# Patient Record
Sex: MALE | Race: WHITE | HISPANIC OR LATINO | Employment: UNEMPLOYED | URBAN - METROPOLITAN AREA
[De-identification: names, ages, dates, MRNs, and addresses within clinical notes are randomized per-mention and may not be internally consistent; named-entity substitution may affect disease eponyms.]

---

## 2019-07-18 ENCOUNTER — TRANSCRIBE ORDERS (OUTPATIENT)
Dept: PHYSICAL THERAPY | Facility: CLINIC | Age: 69
End: 2019-07-18

## 2019-07-18 ENCOUNTER — EVALUATION (OUTPATIENT)
Dept: PHYSICAL THERAPY | Facility: CLINIC | Age: 69
End: 2019-07-18
Payer: COMMERCIAL

## 2019-07-18 DIAGNOSIS — I67.9 HEMIPARESIS OF RIGHT DOMINANT SIDE DUE TO CEREBROVASCULAR DISEASE (HCC): ICD-10-CM

## 2019-07-18 DIAGNOSIS — I63.9 ENCEPHALOMALACIA WITH CEREBRAL INFARCTION (HCC): Primary | ICD-10-CM

## 2019-07-18 DIAGNOSIS — G93.89 ENCEPHALOMALACIA WITH CEREBRAL INFARCTION (HCC): Primary | ICD-10-CM

## 2019-07-18 DIAGNOSIS — G81.91 HEMIPARESIS OF RIGHT DOMINANT SIDE DUE TO CEREBROVASCULAR DISEASE (HCC): ICD-10-CM

## 2019-07-18 PROCEDURE — 97163 PT EVAL HIGH COMPLEX 45 MIN: CPT | Performed by: PHYSICAL THERAPIST

## 2019-07-18 PROCEDURE — 97110 THERAPEUTIC EXERCISES: CPT | Performed by: PHYSICAL THERAPIST

## 2019-07-18 NOTE — LETTER
2019    DO Geetha Sebastian [address]  [address] [address]    Patient: Rey Gomez   YOB: 1950   Date of Visit: 2019     Encounter Diagnosis     ICD-10-CM    1  Encephalomalacia with cerebral infarction ([facility] ) G93 89     I63 9    2  Hemiparesis of right dominant side due to cerebrovascular disease [facility]) I67         Dear Dr Marly Sams:    Please review the attached Plan of Care from [facility] recent visit  Please verify that you agree therapy should continue by signing the attached document and sending it back to our office  If you have any questions or concerns, please don't hesitate to call  Sincerely,    Tye Dang, PT      Referring Provider:      I certify that I have read the below Plan of Care and certify the need for these services furnished under this plan of treatment while under my care  DO Geetha Sebastian [address]  [address]: [phone]          PT Evaluation     Today's date: 2019  Patient name: Rey Gomez  : 1950  MRN: [id number]  Referring provider: Khalida Frankel DO  Dx:   Encounter Diagnosis     ICD-10-CM    1  Encephalomalacia with cerebral infarction ([facility] ) G93 89     I63 9    2  Hemiparesis of right dominant side due to cerebrovascular disease ([facility] ) I67 9     G81 91        Start Time: 1330  Stop Time: 1445  Total time in clinic (min): 75 minutes    Assessment  Assessment details: Louis Elliott presents subacute residual right sided weakness secondary to right CVA  Apparently, patient was experiencing headache, drooping facial muscles and right arm and leg weakness 2019  He was seen in ER and  CT scan revealed a new left parietal lobe infarct  The patient is alert, and speech is good ([place]  His daughter helped with interpretation  /84 HR 68 Ht  5'6 Wt  150   Initially, the patient was unable to walk due to right LE weakness, but this has improved  He is presenting left hand and shoulder syndrome with swelling in wrist and hand and laxity in shoulder  Mild right UE flexion synergy   AROM right ankle DF 5  PF 35  Strength right ankle DF 4   PF -4  AROM right  wrist E 45 F 20    AROM R Elbow F  and  E WNL  Forearm SUP 60  PRON 90  AROM  Right  hand                 Thumb   Index  Middle Ring  fifth    MPC        50         70       60      60      40     PIP          65         75      65      70      35  Fist closure incomplete  Strength Right wrist  E  +3  F -3  AROM Right shoulder ROM F / Abd +3  Elbow -4   R hand dominant  Decreased functional use of right hand for ADL's, including  , pinch, and digital dexterity for writing and other fine motors  Decrease shoulder function for overhead reach, lift and carry  Gait: hesitant and cautious gait with decreased step length and gait speed; inconsistent right heel strike; mild right foot slap     STG  Reduce swelling right wrist and hand   by 25% 2-3 weeks  Increase active Wrist and hand motion ROM 5-8 degrees 2-3 weeks  Promote  fist closure 3 weeks  Increase right UE and LE strength 1/2 grade 4 weeks    LTG  Maximize active wrist and hand and shoulder ROM   Maximize right UE and LE Strength  Improve gait and ambulation stability  Resume normal functional use of right  UE    Plan  Plan details:     2 X per week 6 weeks  MT - passive ROM right UE; STM   TE - right UE ROM LE strengthening  GT        Subjective    Objective   He is presenting left hand and shoulder syndrome with swelling in wrist and hand and laxity in shoulder      AROM Right wrist E 45 F 20  AROM  Right  hand                 Thumb   Index  Middle Ring  fifth    MPC        50         70       60      60      40     PIP          65         75      65      70      35  Fist closure incomplete  Strength Right wrist  E  +3  F -3  AROM Right shoulder ROM F / Abd +3  Elbow -4                Manual          PROM right shoulder, wrist and hand                STM wrist/hand                    Exercise Diary         MAT          Trunk Rotation        March        T-band Abd        SAQ                Seated        LAQ        T-band DF        Pulleys        Active wrist ROM        Finger "dance"        Standing        Calf raise        March        Mini squat                BALANCE         cone reach

## 2019-07-18 NOTE — PROGRESS NOTES
PT Evaluation     Today's date: 2019  Patient name: Iliana Gifford  : 1950  MRN: [id number]  Referring provider: Isidro Wick DO  Dx:   Encounter Diagnosis     ICD-10-CM    1  Encephalomalacia with cerebral infarction ([facility] ) G93 89     I63 9    2  Hemipares[facility] of right dominant side due to [facility] ) I67 9     G81 91        Start Time: 1330  Stop Time: 1445  Total time in clinic (min): 75 minutes    Assessment  Assessment details: Felice Gustafson presents subacute residual right sided weakness secondary to right CVA  Apparently, patient was experiencing headache, drooping facial muscles and right arm and leg weakness 2019  He was seen in ER and  CT scan revealed a new left parietal lobe infarct  The patient is alert, and speech is good ([place]  His daughter helped with interpretation  /84 HR 68 Ht  5'6 Wt  150   Initially, the patient was unable to walk due to right LE weakness, but this has improved  He is presenting left hand and shoulder syndrome with swelling in wrist and hand and laxity in shoulder  Mild right UE flexion synergy     AROM right ankle DF 5  PF 35  Strength right ankle DF 4   PF -4  AROM right  wrist E 45 F 20    AROM R Elbow F  and  E WNL  Forearm SUP 60  PRON 90  AROM  Right  hand                 Thumb   Index  Middle Ring  fifth    MPC        50         70       60      60      40     PIP          65         75      65      70      35  Fist closure incomplete  Strength Right wrist  E  +3  F -3  AROM Right shoulder ROM F / Abd +3  Elbow -4   R hand dominant  Decreased functional use of right hand for ADL's, including  , pinch, and digital dexterity for writing and other fine motors  Decrease shoulder function for overhead reach, lift and carry  Gait: hesitant and cautious gait with decreased step length and gait speed; inconsistent right heel strike; mild right foot slap     STG  Reduce swelling right wrist and hand   by 25% 2-3 weeks  Increase active Wrist and hand motion ROM 5-8 degrees 2-3 weeks  Promote  fist closure 3 weeks  Increase right UE and LE strength 1/2 grade 4 weeks    LTG  Maximize active wrist and hand and shoulder ROM   Maximize right UE and LE Strength  Improve gait and ambulation stability  Resume normal functional use of right  UE    Plan  Plan details:     2 X per week 6 weeks  MT - passive ROM right UE; STM   TE - right UE ROM LE strengthening  GT        Subjective    Objective   He is presenting left hand and shoulder syndrome with swelling in wrist and hand and laxity in shoulder      AROM Right wrist E 45 F 20  AROM  Right  hand                 Thumb   Index  Middle Ring  fifth    MPC        50         70       60      60      40     PIP          65         75      65      70      35  Fist closure incomplete  Strength Right wrist  E  +3  F -3  AROM Right shoulder ROM F / Abd +3  Elbow -4                Manual          PROM right shoulder, wrist and hand                STM wrist/hand                    Exercise Diary         MAT          Trunk Rotation        March        T-band Abd        SAQ                Seated        LAQ        T-band DF        Pulleys        Active wrist ROM        Finger "dance"        Standing        Calf raise        March        Mini squat                BALANCE         cone reach

## 2019-07-23 ENCOUNTER — OFFICE VISIT (OUTPATIENT)
Dept: PHYSICAL THERAPY | Facility: CLINIC | Age: 69
End: 2019-07-23
Payer: COMMERCIAL

## 2019-07-23 DIAGNOSIS — G81.91 HEMIPARESIS OF RIGHT DOMINANT SIDE DUE TO CEREBROVASCULAR DISEASE (HCC): ICD-10-CM

## 2019-07-23 DIAGNOSIS — G93.89 ENCEPHALOMALACIA WITH CEREBRAL INFARCTION (HCC): Primary | ICD-10-CM

## 2019-07-23 DIAGNOSIS — I63.9 ENCEPHALOMALACIA WITH CEREBRAL INFARCTION (HCC): Primary | ICD-10-CM

## 2019-07-23 DIAGNOSIS — I67.9 HEMIPARESIS OF RIGHT DOMINANT SIDE DUE TO CEREBROVASCULAR DISEASE (HCC): ICD-10-CM

## 2019-07-23 PROCEDURE — 97110 THERAPEUTIC EXERCISES: CPT

## 2019-07-23 PROCEDURE — 97140 MANUAL THERAPY 1/> REGIONS: CPT

## 2019-07-23 NOTE — PROGRESS NOTES
Daily Note     Today's date: 2019  Patient name: Rey Gomez  : 1950  MRN: [id number]  Referring provider: Khalida Frankel DO  Dx:   Encounter Diagnosis     ICD-10-CM    1  Encephalomalacia with cerebral infarction ([facility] ) G93 89     I63 9    2  Hemiparesis of right dominant side due to cerebrovascular disease ([facility] ) I67 9     G81 91        Start Time: 1645  Stop Time: 1720  Total time in clinic (min): 35 minutes    Subjective: Pt comments(via ) that his swelling limits his hand motion  Objective:Pt was provided [facility] for edema control to the L hand  PTA instructed his daughter in same for home  PROM of the L UE provided F/U initiation of pulleys for shoulder motion  Assessment: Tolerated treatment well  Patient would benefit from continued PT      Plan: Continue per plan of care  Added tendon glidessimeonty next visit       Manual          PROM right shoulder, wrist and hand 10               STM wrist/hand 10                   Exercise Diary         MAT          Trunk Rotation        March        T-band Abd        SAQ                Seated        LAQ        T-band DF        Pulleys 2 min        Active wrist ROM        Finger "dance" 10x        Standing        Calf raise        March        Mini squat                BALANCE         cone reach

## 2019-07-25 ENCOUNTER — OFFICE VISIT (OUTPATIENT)
Dept: PHYSICAL THERAPY | Facility: CLINIC | Age: 69
End: 2019-07-25
Payer: COMMERCIAL

## 2019-07-25 DIAGNOSIS — I63.9 ENCEPHALOMALACIA WITH CEREBRAL INFARCTION (HCC): Primary | ICD-10-CM

## 2019-07-25 DIAGNOSIS — G81.91 HEMIPARESIS OF RIGHT DOMINANT SIDE DUE TO CEREBROVASCULAR DISEASE (HCC): ICD-10-CM

## 2019-07-25 DIAGNOSIS — I67.9 HEMIPARESIS OF RIGHT DOMINANT SIDE DUE TO CEREBROVASCULAR DISEASE (HCC): ICD-10-CM

## 2019-07-25 DIAGNOSIS — G93.89 ENCEPHALOMALACIA WITH CEREBRAL INFARCTION (HCC): Primary | ICD-10-CM

## 2019-07-25 PROCEDURE — 97535 SELF CARE MNGMENT TRAINING: CPT | Performed by: PHYSICAL THERAPIST

## 2019-07-25 PROCEDURE — 97110 THERAPEUTIC EXERCISES: CPT | Performed by: PHYSICAL THERAPIST

## 2019-07-25 PROCEDURE — 97140 MANUAL THERAPY 1/> REGIONS: CPT | Performed by: PHYSICAL THERAPIST

## 2019-07-25 NOTE — PROGRESS NOTES
Daily Note     Today's date: 2019  Patient name: Katey Dunn  : 1950  MRN: [id number]  Referring provider: Jabari Estrada DO  Dx:   Encounter Diagnosis     ICD-10-CM    1  Encephalomalacia with cerebral infarction ([facility] ) G93 89     I63 9    2  Hemiparesis of right dominant side due to cerebrovascular disease ([email or web address] ) I67 9     G81 91        Start Time: 1600  Stop Time: 1655  Total time in clinic (min): 55 minutes    Subjective: Pt with no new complaints at this time  No pain reported at start of treatment  Objective: See treatment diary below      Assessment: Tolerated treatment well  Patient would benefit from continued PT   used t/o tx 2* communication barrier  Pt tolerated manual techniques well with minor pain reported in posterior shoulder at end range ER  Decreased swelling noted of right hand/wrist compared to previous treatment  Continue to progress as tolerated  Provided HEP for tendon glides  Plan: Continue per plan of care  Progress treatment as tolerated         Manual         PROM right shoulder, wrist and hand 10 10              STM wrist/hand 10 10                  Exercise Diary        MAT          Trunk Rotation        March        T-band Abd        SAQ                Seated        LAQ        T-band DF        Pulleys 2 min  2 min      Active wrist ROM        Finger "dance" 10x  20x      Standing        Calf raise        March        Mini squat                BALANCE         cone reach                Tendon glides  HEP

## 2019-07-30 ENCOUNTER — OFFICE VISIT (OUTPATIENT)
Dept: PHYSICAL THERAPY | Facility: CLINIC | Age: 69
End: 2019-07-30
Payer: COMMERCIAL

## 2019-07-30 DIAGNOSIS — G81.91 HEMIPARESIS OF RIGHT DOMINANT SIDE DUE TO CEREBROVASCULAR DISEASE (HCC): ICD-10-CM

## 2019-07-30 DIAGNOSIS — I67.9 HEMIPARESIS OF RIGHT DOMINANT SIDE DUE TO CEREBROVASCULAR DISEASE (HCC): ICD-10-CM

## 2019-07-30 DIAGNOSIS — G93.89 ENCEPHALOMALACIA WITH CEREBRAL INFARCTION (HCC): Primary | ICD-10-CM

## 2019-07-30 DIAGNOSIS — I63.9 ENCEPHALOMALACIA WITH CEREBRAL INFARCTION (HCC): Primary | ICD-10-CM

## 2019-07-30 PROCEDURE — 97112 NEUROMUSCULAR REEDUCATION: CPT | Performed by: PHYSICAL THERAPIST

## 2019-07-30 PROCEDURE — 97110 THERAPEUTIC EXERCISES: CPT | Performed by: PHYSICAL THERAPIST

## 2019-07-30 PROCEDURE — 97140 MANUAL THERAPY 1/> REGIONS: CPT | Performed by: PHYSICAL THERAPIST

## 2019-07-30 NOTE — PROGRESS NOTES
Daily Note     Today's date: 2019  Patient name: Delicia Escamilla  : 1950  MRN: [id number]  Referring provider: Gabe Farias DO  Dx:   Encounter Diagnosis     ICD-10-CM    1  Encephalomalacia with cerebral infarction ([facility] ) G93 89     I63 9    2  Hemiparesis of right dominant side due to cerebrovascular disease ([facility] ) I67 9     G81 91        Start Time: 1620  Stop Time: 1725  Total time in clinic (min): 65 minutes    Subjective: Patient continues to have swelling in right hand, and shoulder hand syndrome pain  Objective:PT added UBE to program, and used Velcro wrap to secure  on handle, as well, when using pulleys  PT performed some facilitory shoulder approximation and triceps tapping to elicit control of elbow extension with the patient in supine  Assessment: Tolerated treatment well  Patient noted better hand motion after treatment      Plan: Continue per plan of care        Manual        PROM right shoulder, wrist and hand 10 10 10     Facilitory tech    10     STM wrist/hand 10 10 5                 Exercise Diary       MAT     #4      Trunk Rotation           T-band Abd        SAQ   #4 3/10     Cane press   2/10             UBE   LV3 6 min     Seated        LAQ        T-band DF        Pulleys 2 min  2 min 4 min     Active wrist ROM        Finger "dance" 10x  20x 2/10     Standing        Calf raise        March        Mini squat                BALANCE         cone reach                Tendon glides  HEP

## 2019-08-01 ENCOUNTER — OFFICE VISIT (OUTPATIENT)
Dept: PHYSICAL THERAPY | Facility: CLINIC | Age: 69
End: 2019-08-01
Payer: COMMERCIAL

## 2019-08-01 DIAGNOSIS — G81.91 HEMIPARESIS OF RIGHT DOMINANT SIDE DUE TO CEREBROVASCULAR DISEASE (HCC): ICD-10-CM

## 2019-08-01 DIAGNOSIS — I63.9 ENCEPHALOMALACIA WITH CEREBRAL INFARCTION (HCC): Primary | ICD-10-CM

## 2019-08-01 DIAGNOSIS — G93.89 ENCEPHALOMALACIA WITH CEREBRAL INFARCTION (HCC): Primary | ICD-10-CM

## 2019-08-01 DIAGNOSIS — I67.9 HEMIPARESIS OF RIGHT DOMINANT SIDE DUE TO CEREBROVASCULAR DISEASE (HCC): ICD-10-CM

## 2019-08-01 PROCEDURE — 97110 THERAPEUTIC EXERCISES: CPT | Performed by: PHYSICAL THERAPIST

## 2019-08-01 PROCEDURE — 97140 MANUAL THERAPY 1/> REGIONS: CPT | Performed by: PHYSICAL THERAPIST

## 2019-08-01 NOTE — PROGRESS NOTES
Daily Note     Today's date: 2019  Patient name: Remi Cope  : 1950  MRN: [id number]  Referring provider: Tiara Velásquez DO  Dx:   Encounter Diagnosis     ICD-10-CM    1  Encephalomalacia with cerebral infarction ([facility] ) G93 89     I63 9    2  Hemiparesis of right dominant side due to cerebrovascular disease ([facility] ) I67 9     G81 91        Start Time:   Stop Time:   Total time in clinic (min): 60 minutes    Subjective: Patient noted improvement in hand and finger motion since previous session  Objective: PT directed exercises, and repeated facilitory techniques of shoulder approximation and triceps tapping      Assessment: Tolerated treatment well  Patient's family instructed in HEP  Plan: Continue per plan of care        Manual       PROM right shoulder, wrist and hand 10 10 10 5    AAROM right shoulder, wrist and hand    5    Facilitory tech    10 10    STM wrist/hand 10 10 5 5                Exercise Diary      MAT     #4      Trunk Rotation           T-band Abd        SAQ   #4 3/10     Cane press   2/10             UBE   LV3 6 min LV3 8 m    Seated        LAQ        T-band DF        Pulleys 2 min  2 min 4 min 4m    Active wrist ROM        Finger "dance" 10x  20x 2/10 2/10    Standing        Calf raise    ---->    March    ---->    Mini squat    --->            BALANCE         cone reach                Tendon glides  HEP

## 2019-08-06 ENCOUNTER — OFFICE VISIT (OUTPATIENT)
Dept: PHYSICAL THERAPY | Facility: CLINIC | Age: 69
End: 2019-08-06
Payer: COMMERCIAL

## 2019-08-06 DIAGNOSIS — G81.91 HEMIPARESIS OF RIGHT DOMINANT SIDE DUE TO CEREBROVASCULAR DISEASE (HCC): ICD-10-CM

## 2019-08-06 DIAGNOSIS — G93.89 ENCEPHALOMALACIA WITH CEREBRAL INFARCTION (HCC): Primary | ICD-10-CM

## 2019-08-06 DIAGNOSIS — I63.9 ENCEPHALOMALACIA WITH CEREBRAL INFARCTION (HCC): Primary | ICD-10-CM

## 2019-08-06 DIAGNOSIS — I67.9 HEMIPARESIS OF RIGHT DOMINANT SIDE DUE TO CEREBROVASCULAR DISEASE (HCC): ICD-10-CM

## 2019-08-06 PROCEDURE — 97110 THERAPEUTIC EXERCISES: CPT

## 2019-08-06 NOTE — PROGRESS NOTES
Daily Note     Today's date: 2019  Patient name: Ranjana Saini  : 1950  MRN: [id number]  Referring provider: Kami Núñez DO  Dx:   Encounter Diagnosis     ICD-10-CM    1  Encephalomalacia with cerebral infarction ([facility] 75 ) G93 89     I63 9    2  Hemiparesis of right dominant side due to cerebrovascular disease ([facility] ) I67 9     G81 91        Start Time: 1630  Stop Time: 1715  Total time in clinic (min): 45 minutes    Subjective: Patient reported doing "good, a little pain" in R hand  Objective: PTA directed patient in TE program, adding standing TE , See treatment diary below  Assessment: Tolerated treatment well  Patient demonstrated fatigue post treatment, increased pain noted in R wrist post supine cane press, however, More ROM with R shoulder   Plan: Continue per plan of care        Manual    8/6   PROM right shoulder, wrist and hand 10 10 10 5    AAROM right shoulder, wrist and hand    5    Facilitory tech    10 10    STM wrist/hand 10 10 5 5                Exercise Diary   8/6   MAT     #4   4#    Trunk Rotation     2#         T-band Abd        SAQ   #4 3/10  4# 3/10   Cane press   2/10  4/5           UBE   LV3 6 min LV3 8 m L3 8 min   Seated        LAQ        T-band DF        Pulleys 2 min  2 min 4 min 4m 4 min    Active wrist ROM        Finger "dance" 10x  20x 2/10 2/10    Standing        Calf raise    ----> 2/10   March    ----> 2/10   Mini squat    --->            BALANCE         cone reach                Tendon glides  HEP

## 2019-08-08 ENCOUNTER — OFFICE VISIT (OUTPATIENT)
Dept: PHYSICAL THERAPY | Facility: CLINIC | Age: 69
End: 2019-08-08
Payer: COMMERCIAL

## 2019-08-08 DIAGNOSIS — I67.9 HEMIPARESIS OF RIGHT DOMINANT SIDE DUE TO CEREBROVASCULAR DISEASE (HCC): ICD-10-CM

## 2019-08-08 DIAGNOSIS — I63.9 ENCEPHALOMALACIA WITH CEREBRAL INFARCTION (HCC): Primary | ICD-10-CM

## 2019-08-08 DIAGNOSIS — G93.89 ENCEPHALOMALACIA WITH CEREBRAL INFARCTION (HCC): Primary | ICD-10-CM

## 2019-08-08 DIAGNOSIS — G81.91 HEMIPARESIS OF RIGHT DOMINANT SIDE DUE TO CEREBROVASCULAR DISEASE (HCC): ICD-10-CM

## 2019-08-08 PROCEDURE — 97110 THERAPEUTIC EXERCISES: CPT

## 2019-08-08 NOTE — PROGRESS NOTES
Daily Note     Today's date: 2019  Patient name: Jorge Monte  : 1950  MRN: [id number]  Referring provider: Rigoberto Torres DO  Dx:   Encounter Diagnosis     ICD-10-CM    1  Encephalomalacia with cerebral infarction ([facility] ) G93 89     I63 9    2  Hemiparesis of right dominant side due to cerebrovascular disease ([facility] ) I67 9     G81 91        Start Time: 1630  Stop Time: 1725  Total time in clinic (min): 55 minutes    Subjective: Pt's family reports they are continuing the STM to the  R hand  Objective: See treatment diary below  Slightly less swelling noted in the R hand today  Modified cane flextion to supine AAROM with hand hold, added elbow flares, supine  Pt was instructed to continue same for HEP  Added shampoo bottle squeeze for functional gripping at home  Assessment: Tolerated treatment fair  Patient demonstrated fatigue post treatment      Plan: Continue per plan of care        Manual      PROM right shoulder, wrist and hand  10 10 5    AAROM right shoulder, wrist and hand    5    Facilitory tech    10 10    STM wrist/hand  10 5 5                Exercise Diary     MAT     #4   4#    Trunk Rotation 6#   2#  3# 2/20       T-band Abd L3 3/10       SAQ 4# 3/10  #4 3/10  4# 3/10   Cane  hold 2/10  2/10  4/           UBE 8m L4 no strap  LV3 6 min LV3 8 m L3 8 min   Seated        LAQ        T-band DF        Pulleys 4 min  2 min 4 min 4m 4 min    Active wrist ROM        Finger "dance" 10x  20x 2/10 2/10    Standing        Calf raise ------>   ----> 2/10   March ------>   ----> 2/10   Mini squat ------->   ---> 4/5           BALANCE         cone reach        Theraputty /key pinch Yellow 10ea       Tendon glides  HEP

## 2019-08-13 ENCOUNTER — OFFICE VISIT (OUTPATIENT)
Dept: PHYSICAL THERAPY | Facility: CLINIC | Age: 69
End: 2019-08-13
Payer: COMMERCIAL

## 2019-08-13 DIAGNOSIS — I67.9 HEMIPARESIS OF RIGHT DOMINANT SIDE DUE TO CEREBROVASCULAR DISEASE (HCC): ICD-10-CM

## 2019-08-13 DIAGNOSIS — G81.91 HEMIPARESIS OF RIGHT DOMINANT SIDE DUE TO CEREBROVASCULAR DISEASE (HCC): ICD-10-CM

## 2019-08-13 DIAGNOSIS — G93.89 ENCEPHALOMALACIA WITH CEREBRAL INFARCTION (HCC): Primary | ICD-10-CM

## 2019-08-13 DIAGNOSIS — I63.9 ENCEPHALOMALACIA WITH CEREBRAL INFARCTION (HCC): Primary | ICD-10-CM

## 2019-08-13 PROCEDURE — 97110 THERAPEUTIC EXERCISES: CPT

## 2019-08-13 NOTE — PROGRESS NOTES
Daily Note     Today's date: 2019  Patient name: Sandor Alexander  : 1950  MRN: [id number]  Referring provider: Alla Portillo DO  Dx:   Encounter Diagnosis     ICD-10-CM    1  Encephalomalacia with cerebral infarction ([facility] 75 ) G93 89     I63 9    2  Hemiparesis of right dominant side due to cerebrovascular disease ([facility] ) I67 9     G81 91        Start Time: 1640  Stop Time: 1730  Total time in clinic (min): 50 minutes    Subjective: Pt's daughter states he is performing his HEP as directed  Objective: See treatment diary below  Less hand swelling is noted with continued TE/massage at home  Assessment: Tolerated treatment well  Patient exhibited good technique with therapeutic exercises      Plan: Continue per plan of care        Manual      PROM right shoulder, wrist and hand   10 5    AAROM right shoulder, wrist and hand    5    Facilitory tech    10 10    STM wrist/hand  home 5 5                Exercise Diary   8   MAT     #4   4#    Trunk Rotation 6#  8#   2#  3#  4#       T-band Abd L3 3/10 L3 3/10      SAQ 4# 3/10 5# 3/10 #4 3/10  4# 3/10   Cane  hold 2/10 2/10 hh 2/10  4           UBE 8m L4 no strap 8m L4 no strap LV3 6 min LV3 8 m L3 8 min   Seated        LAQ        T-band DF        Pulleys 4 min  4 min 4 min 4m 4 min    Active wrist ROM        Finger "dance" 10x  20x 2/10 2/10    Standing        Calf raise ------>   ----> 2/10   March ------>   ----> 2/10   Mini squat ------->   --->            BALANCE         cone reach        Theraputty /key pinch Yellow 2/10ea Y2/10 ea      Tendon glides  HEP

## 2019-08-22 ENCOUNTER — OFFICE VISIT (OUTPATIENT)
Dept: PHYSICAL THERAPY | Facility: CLINIC | Age: 69
End: 2019-08-22
Payer: COMMERCIAL

## 2019-08-22 ENCOUNTER — TRANSCRIBE ORDERS (OUTPATIENT)
Dept: PHYSICAL THERAPY | Facility: CLINIC | Age: 69
End: 2019-08-22

## 2019-08-22 DIAGNOSIS — I67.9 HEMIPARESIS OF RIGHT DOMINANT SIDE DUE TO CEREBROVASCULAR DISEASE (HCC): ICD-10-CM

## 2019-08-22 DIAGNOSIS — I63.9 ENCEPHALOMALACIA WITH CEREBRAL INFARCTION (HCC): Primary | ICD-10-CM

## 2019-08-22 DIAGNOSIS — G81.91 HEMIPARESIS OF RIGHT DOMINANT SIDE DUE TO CEREBROVASCULAR DISEASE (HCC): ICD-10-CM

## 2019-08-22 DIAGNOSIS — G93.89 ENCEPHALOMALACIA WITH CEREBRAL INFARCTION (HCC): Primary | ICD-10-CM

## 2019-08-22 PROCEDURE — 97112 NEUROMUSCULAR REEDUCATION: CPT | Performed by: PHYSICAL THERAPIST

## 2019-08-22 PROCEDURE — 97140 MANUAL THERAPY 1/> REGIONS: CPT | Performed by: PHYSICAL THERAPIST

## 2019-08-22 PROCEDURE — 97110 THERAPEUTIC EXERCISES: CPT | Performed by: PHYSICAL THERAPIST

## 2019-08-22 NOTE — LETTER
2019    Shilpa Abbasi DO  [address]  [address] [facility] [address]    Patient: Aleena Srivastava   YOB: 1950   Date of Visit: 2019     Encounter Diagnosis     ICD-10-CM    1  Encephalomalacia with cerebral infarction ([facility] [facility] ) G93 89     I63 9    2  Hemiparesis of right dominant side due to cerebrovascular disease [facility]) I67 9     G81 91        Dear Dr Salcedo Neighbor: Thank you for your recent referral of Aleena Srivastava  Please review the attached evaluation summary from Jonah's recent visit  Please verify that you agree with the plan of care by signing the attached order  If you have any questions or concerns, please do not hesitate to call  I sincerely appreciate the opportunity to share in the care of one of your patients and hope to have another opportunity to work with you in the near future  Sincerely,    Maria Ines Murrieta, PT      Referring Provider:      I certify that I have read the below Plan of Care and certify the need for these services furnished under this plan of treatment while under my care  DO Nate [address]  [address]: [phone]          PT Re-Evaluation     Today's date: 2019  Patient name: Aleena Srivastava  : 1950  MRN: [id number]  Referring provider: Gianni Ramos DO  Dx:   Encounter Diagnosis     ICD-10-CM    1  Encephalomalacia with cerebral infarction ([facility] ) G93 89     I63 9    2  Hemiparesis of right dominant side due to cerebrovascular disease ([facility] ) I67 9     G81 91        Start Time: 1615  Stop Time: 1735  Total time in clinic (min): 80 minutes    Assessment  Assessment details: Petra Boo presents subacute residual right sided weakness secondary to right CVA 2019  The patient is alert, and speech is good (K[place]); his daughter helps with interpretation     The patient continues to have limitations in motion and pain related to hand and shoulder syndrome  The swelling in his left wrist and hand and laxity in shoulder persist   Mild right UE flexion synergy   AROM right ankle DF 6 PF 38 Strength right ankle DF 4   PF 4  AROM right  wrist E 48 F 20    AROM R Elbow F  and  E WNL  Forearm SUP 60  PRON 90  AROM  Right  hand                 Thumb   Index  Middle Ring  fifth    MPC        52         70       60      60      40     PIP          68         75      68     72      45  Fist closure is complete, but fluctuates in ROM with localized hand and digit swelling  Strength Right wrist  E  +3  F -3  AROM Right shoulder ROM F / Abd +3  Elbow -4   R hand dominant  Decreased functional use of right hand for ADL's, including  , pinch, and digital dexterity for writing and other fine motors  Decrease shoulder function for overhead reach, lift and carry  Gait: hesitant and cautious gait with decreased step length and gait speed; inconsistent right heel strike; mild right foot slap     STG  Reduce swelling right wrist and hand   by 25% 2-3 weeks - Partial  Increase active Wrist and hand motion ROM 5-8 degrees 2-3 weeks - Partial  Promote  fist closure 3 weeks - Achieved  Increase right UE and LE strength 1/2 grade 4 weeks - Partial    LTG  Maximize active wrist and hand and shoulder ROM   Maximize right UE and LE Strength  Improve gait and ambulation stability  Resume normal functional use of right  UE     Plan  The patient will benefit from additional skilled physical therapy intervention:     2 X per week 6 weeks  MT - passive ROM right UE; STM   TE - right UE ROM LE strengthening  GT            Subjective Patient continues to have difficulty with functional use of right UE, especially gripping      Objective   AROM right ankle DF 6 PF 38 Strength right ankle DF 4   PF 4  AROM right  wrist E 48 F 20

## 2019-08-22 NOTE — PROGRESS NOTES
PT Re-Evaluation     Today's date: 2019  Patient name: Zulma Washington  : 1950  MRN: [id number]  Referring provider: Quique Valdez DO  Dx:   Encounter Diagnosis     ICD-10-CM    1  Encephalomalacia with cerebral infarction ([facility] 75 ) G93 89     I63 9    2  Hemiparesis of right dominant side due to cerebrovascular disease ([facility] 75 ) I67 9     G81 91        Start Time: 1615  Stop Time: 1735  Total time in clinic (min): 80 minutes    Assessment  Assessment details: Suki Rivas presents subacute residual right sided weakness secondary to right CVA 2019  The patient is alert, and speech is good (Slovenian); his daughter helps with interpretation  The patient continues to have limitations in motion and pain related to  hand and shoulder syndrome  The swelling in his left wrist and hand and laxity in shoulder persist   Mild right UE flexion synergy     AROM right ankle DF 6 PF 38 Strength right ankle DF 4   PF 4  AROM right  wrist E 48 F 20    AROM R Elbow F  and  E WNL  Forearm SUP 60  PRON 90  AROM  Right  hand                 Thumb   Index  Middle Ring  fifth    MPC        52         70       60      60      40     PIP          68         75      68     72      45  Fist closure is complete, but fluctuates in ROM with localized hand and digit swelling  Strength Right wrist  E  +3  F -3  AROM Right shoulder ROM F / Abd +3  Elbow -4   R hand dominant  Decreased functional use of right hand for ADL's, including  , pinch, and digital dexterity for writing and other fine motors  Decrease shoulder function for overhead reach, lift and carry  Gait: hesitant and cautious gait with decreased step length and gait speed; inconsistent right heel strike; mild right foot slap     STG  Reduce swelling right wrist and hand   by 25% 2-3 weeks - Partial  Increase active Wrist and hand motion ROM 5-8 degrees 2-3 weeks - Partial  Promote  fist closure 3 weeks - Achieved  Increase right UE and LE strength 1/2 grade 4 weeks - Partial    LTG  Maximize active wrist and hand and shoulder ROM   Maximize right UE and LE Strength  Improve gait and ambulation stability  Resume normal functional use of right  UE     Plan  The patient will benefit from additional skilled physical therapy intervention:     2 X per week 6 weeks  MT - passive ROM right UE; STM   TE - right UE ROM LE strengthening  GT            Subjective Patient continues to have difficulty with functional use of right UE, especially gripping      Objective   AROM right ankle DF 6 PF 38 Strength right ankle DF 4   PF 4  AROM right  wrist E 48 F 20

## 2019-08-27 ENCOUNTER — OFFICE VISIT (OUTPATIENT)
Dept: PHYSICAL THERAPY | Facility: CLINIC | Age: 69
End: 2019-08-27
Payer: COMMERCIAL

## 2019-08-27 ENCOUNTER — APPOINTMENT (OUTPATIENT)
Dept: PHYSICAL THERAPY | Facility: CLINIC | Age: 69
End: 2019-08-27
Payer: COMMERCIAL

## 2019-08-27 DIAGNOSIS — G93.89 ENCEPHALOMALACIA WITH CEREBRAL INFARCTION (HCC): Primary | ICD-10-CM

## 2019-08-27 DIAGNOSIS — I63.9 ENCEPHALOMALACIA WITH CEREBRAL INFARCTION (HCC): Primary | ICD-10-CM

## 2019-08-27 DIAGNOSIS — G81.91 HEMIPARESIS OF RIGHT DOMINANT SIDE DUE TO CEREBROVASCULAR DISEASE (HCC): ICD-10-CM

## 2019-08-27 DIAGNOSIS — I67.9 HEMIPARESIS OF RIGHT DOMINANT SIDE DUE TO CEREBROVASCULAR DISEASE (HCC): ICD-10-CM

## 2019-08-27 PROCEDURE — 97110 THERAPEUTIC EXERCISES: CPT

## 2019-08-27 PROCEDURE — 97140 MANUAL THERAPY 1/> REGIONS: CPT

## 2019-08-27 PROCEDURE — 97112 NEUROMUSCULAR REEDUCATION: CPT

## 2019-08-27 NOTE — PROGRESS NOTES
Daily Note     Today's date: 2019  Patient name: Blaze Saldana  : 1950  MRN: [id number]  Referring provider: Zenia Santiago DO  Dx:   Encounter Diagnosis     ICD-10-CM    1  Encephalomalacia with cerebral infarction ([facility] 75 ) G93 89     I63 9    2  Hemiparesis of right dominant side due to cerebrovascular disease ([facility] ) I67 9     G81 91        Start Time: 1600  Stop Time: 1700  Total time in clinic (min): 60 minutes    Subjective: Pt continues with c/o posterior shoulder pain, dorsal wrist and digit stiffness  Objective: See treatment diary below      Assessment: Tolerated treatment well  Patient exhibited good technique with therapeutic exercises with the exception of shoulder motion  Increased functional use of his hand  Plan: Continue per plan of care              Manual    8   PROM right shoulder, wrist and hand     10 5     AAROM right shoulder, wrist and hand       5     Facilitory tech       10     STM wrist/hand   home home 5                         Exercise Diary   8   MAT          4# 2/20   Trunk Rotation 6# 220 8# 2/20 8# 2/20   2#  3#  4#   4# 2       T-band Abd L3 3/10 L3 3/10  L4 3/10       SAQ 4# 3/10 5# 3/10 #4 3/10   4# 3/10   Cane  hold 2/10 2/10 hh 2/10 hh   4/5    Nu-step      L4  10m       UBE 8m L4 no strap 8m L4 no strap 8m L4 LV3 8 m L3 8 min   Seated             LAQ             T-band DF             Pulleys 4 min  4 min 4 min 4m 4 min    Active wrist ROM             Finger "dance" 10x  20x home 2/10     Standing             Calf raise ------>     ----> 2/10   March ------>     ----> 2/10   Mini squat ------->     --->                  BALANCE              cone reach             Theraputty /key pinch Yellow /10ea Y2/10 ea  O  /10  y 2/10       Tendon glides   HEP

## 2019-08-28 ENCOUNTER — APPOINTMENT (OUTPATIENT)
Dept: PHYSICAL THERAPY | Facility: CLINIC | Age: 69
End: 2019-08-28
Payer: COMMERCIAL

## 2019-09-09 ENCOUNTER — OFFICE VISIT (OUTPATIENT)
Dept: PHYSICAL THERAPY | Facility: CLINIC | Age: 69
End: 2019-09-09
Payer: COMMERCIAL

## 2019-09-09 DIAGNOSIS — G93.89 ENCEPHALOMALACIA WITH CEREBRAL INFARCTION (HCC): Primary | ICD-10-CM

## 2019-09-09 DIAGNOSIS — G81.91 HEMIPARESIS OF RIGHT DOMINANT SIDE DUE TO CEREBROVASCULAR DISEASE (HCC): ICD-10-CM

## 2019-09-09 DIAGNOSIS — I67.9 HEMIPARESIS OF RIGHT DOMINANT SIDE DUE TO CEREBROVASCULAR DISEASE (HCC): ICD-10-CM

## 2019-09-09 DIAGNOSIS — I63.9 ENCEPHALOMALACIA WITH CEREBRAL INFARCTION (HCC): Primary | ICD-10-CM

## 2019-09-09 PROCEDURE — 97110 THERAPEUTIC EXERCISES: CPT

## 2019-09-09 NOTE — PROGRESS NOTES
Daily Note     Today's date: 2019  Patient name: Blaze Saldana  : 1950  MRN: [id number]  Referring provider: Zenia Santiago DO  Dx:   Encounter Diagnosis     ICD-10-CM    1  Encephalomalacia with cerebral infarction ([facility] 75 ) G93 89     I63 9    2  Hemiparesis of right dominant side due to cerebrovascular disease ([facility] 75 ) I67 9     G81 91        Start Time: 1645  Stop Time: 1730  Total time in clinic (min): 45 minutes    Subjective: Pt continues with R hand/wrist pain  Objective: See treatment diary below      Assessment: Tolerated treatment fair  Patient would benefit from continued PT      Plan: Continue per plan of care        Manual      PROM right shoulder, wrist and hand     10      AAROM right shoulder, wrist and hand            Facilitory tech             STM wrist/hand   home home                          Exercise Diary     MAT           4# 2   Trunk Rotation 6#  8#  8#   10# 2#  3#  4#   4#   5#      T-band Abd L3 3/10 L3 3/10  L4 3/10  L5 3/10     SAQ 4# 3/10 5# 3/10 #4 3/10  5# 3/10 4# 3/10   Cane  hold 2/10 2/10 hh 2/10 hh  2/10hh 4/5    Nu-step      L4  10m  L4 10m     UBE 8m L4 no strap 8m L4 no strap 8m L4 L4 8 m L3 8 min   Seated             LAQ             T-band DF             Pulleys 4 min  4 min 4 min ------> 4 min    Active wrist ROM             Finger "dance" 10x  20x home 2/10     Standing             Calf raise ------>     ----> 2/10   March ------>     ----> 2/10   Mini squat ------->     ---> /                 BALANCE              cone reach             Theraputty /key pinch Yellow 2/10ea Y2/10 ea  O  2/10  y 2/10  ----->     Tendon glides   HEP

## 2019-09-12 ENCOUNTER — OFFICE VISIT (OUTPATIENT)
Dept: PHYSICAL THERAPY | Facility: CLINIC | Age: 69
End: 2019-09-12
Payer: COMMERCIAL

## 2019-09-12 DIAGNOSIS — G81.91 HEMIPARESIS OF RIGHT DOMINANT SIDE DUE TO CEREBROVASCULAR DISEASE (HCC): ICD-10-CM

## 2019-09-12 DIAGNOSIS — I63.9 ENCEPHALOMALACIA WITH CEREBRAL INFARCTION (HCC): Primary | ICD-10-CM

## 2019-09-12 DIAGNOSIS — G93.89 ENCEPHALOMALACIA WITH CEREBRAL INFARCTION (HCC): Primary | ICD-10-CM

## 2019-09-12 DIAGNOSIS — I67.9 HEMIPARESIS OF RIGHT DOMINANT SIDE DUE TO CEREBROVASCULAR DISEASE (HCC): ICD-10-CM

## 2019-09-12 PROCEDURE — 97140 MANUAL THERAPY 1/> REGIONS: CPT | Performed by: PHYSICAL THERAPIST

## 2019-09-12 PROCEDURE — 97110 THERAPEUTIC EXERCISES: CPT | Performed by: PHYSICAL THERAPIST

## 2019-09-12 NOTE — PROGRESS NOTES
Daily Note     Today's date: 2019  Patient name: Roxanne Nogueira  : 1950  MRN: [id number]  Referring provider: Machelle Delarosa DO  Dx:   Encounter Diagnosis     ICD-10-CM    1  Encephalomalacia with cerebral infarction ([facility] ) G93 89     I63 9    2  Hemiparesis of right dominant side due to cerebrovascular disease ([facility] ) I67 9     G81 91        Start Time: 1030  Stop Time: 1130  Total time in clinic (min): 60 minutes    Subjective: Patient scheduled to return to neurologist this afternoon  He is making progress, and demonstrates improvement in right extremity movement  His right hand and fingers are still swollen, but movement and grasp  is improving     Objective: PT performed passive and active assisted ROM right UE, and directed exercise program one-on-one  Assessment: Tolerated treatment well  Plan: Continue per plan of care   Await any recommendations following neuro appointment     Manual      PROM right shoulder, wrist and hand     10   5   AAROM right shoulder, wrist and hand         5   Facilitory tech          5   STM wrist/hand   home home                          Exercise Diary     MAT              Trunk Rotation 6# 2 8# 2/20 8# 2/20  10#2/ 6#  3# 2 4# 2  4# 2/  5# 2  3# 2/20   T-band Abd L3 3/10 L3 3/10  L4 3/10  L5 3/10  L5 3/10   SAQ 4# 3/10 5# 3/10 #4 3/10  5# 3/10 4# 3/10   Cane  hold 2/10 2/10 hh 210 hh  2/10hh 3/10    Nu-step      L4  10m  L4 10m  L4 10m rev   UBE 8m L4 no strap 8m L4 no strap 8m L4 L4 8 m L4 8 min   Seated             LAQ             T-band DF             Pulleys 4 min  4 min 4 min ------> 4 min    Active wrist ROM             Finger "dance" 10x  20x home 10     Standing             Calf raise ------>     ----> --->   March ------>     ----> --->   Mini squat ------->     ---> --->                 BALANCE              cone reach             Theraputty /key pinch Yellow 2/10ea Y2/10 ea  O  2/10  y 2/10  ----->     Tendon glides   HEP

## 2019-09-17 ENCOUNTER — APPOINTMENT (OUTPATIENT)
Dept: PHYSICAL THERAPY | Facility: CLINIC | Age: 69
End: 2019-09-17
Payer: COMMERCIAL

## 2019-09-17 ENCOUNTER — OFFICE VISIT (OUTPATIENT)
Dept: PHYSICAL THERAPY | Facility: CLINIC | Age: 69
End: 2019-09-17
Payer: COMMERCIAL

## 2019-09-17 DIAGNOSIS — I67.9 HEMIPARESIS OF RIGHT DOMINANT SIDE DUE TO CEREBROVASCULAR DISEASE (HCC): ICD-10-CM

## 2019-09-17 DIAGNOSIS — G93.89 ENCEPHALOMALACIA WITH CEREBRAL INFARCTION (HCC): Primary | ICD-10-CM

## 2019-09-17 DIAGNOSIS — I63.9 ENCEPHALOMALACIA WITH CEREBRAL INFARCTION (HCC): Primary | ICD-10-CM

## 2019-09-17 DIAGNOSIS — G81.91 HEMIPARESIS OF RIGHT DOMINANT SIDE DUE TO CEREBROVASCULAR DISEASE (HCC): ICD-10-CM

## 2019-09-17 PROCEDURE — 97110 THERAPEUTIC EXERCISES: CPT | Performed by: PHYSICAL THERAPIST

## 2019-09-17 PROCEDURE — 97140 MANUAL THERAPY 1/> REGIONS: CPT | Performed by: PHYSICAL THERAPIST

## 2019-09-17 NOTE — PROGRESS NOTES
Daily Note     Today's date: 2019  Patient name: Iliana Gifford  : 1950  MRN: [id number]  Referring provider: Isidro Wick DO  Dx:   Encounter Diagnosis     ICD-10-CM    1  Encephalomalacia with cerebral infarction ([facility] 75 ) G93 89     I63 9    2  Hemiparesis of right dominant side due to cerebrovascular disease ([facility] ) I67 9     G81 91        Start Time: 1540  Stop Time: 1640  Total time in clinic (min): 60 minutes    Subjective: Patient continues to have right wrist and hand pain  He complains less of shoulder pain during ROM exercises  Objective: PT performed UE facilitory techniques to elicit better control of elbow extension and shoulder stabilization  Directed exercises one-one  See treatment diary below      Assessment: Tolerated treatment well  Patient was dizzy after supine exercises  /74  Recovered after seated rest 1-2 min      Plan: Continue per plan of care        Manual      PROM right shoulder, wrist and hand     10   5   AAROM right shoulder, wrist and hand         5   Facilitory tech          5   STM wrist/hand   home home                          Exercise Diary     MAT              Trunk Rotation 8# 2 8# 2/ 8# 2  10#2/ 6#  4#  4# 2  4# 2  5#   3# 2/   T-band Abd L3 3/10 L3 3/10  L4 3/10  L5 3/10  L5 3/10   SAQ 4# 3/10 5# 3/10 #4 3/10  5# 3/10 4# 3/10   Cane press   2/20 2/10 hh 2/10 hh  2/10hh 3/10    Nu-step  L4 10 m    L4  10m  L4 10m  L4 10m rev   UBE 10m L4    8m L4 no strap 8m L4 L4 8 m L4 8 min   Seated             LAQ             T-band DF             Pulleys 4 min  4 min 4 min ------> 4 min    Active wrist ROM             Finger "dance" 10x  20x home 10     Standing             Calf raise ------>     ----> --->   March ------>     ----> --->   Mini squat ------->     ---> --->                 BALANCE              cone reach             Theraputty /key pinch  Y2/10 elliott  O  2/10  y 2/10  ----->     Tendon glides   HEP

## 2019-09-18 ENCOUNTER — APPOINTMENT (OUTPATIENT)
Dept: PHYSICAL THERAPY | Facility: CLINIC | Age: 69
End: 2019-09-18
Payer: COMMERCIAL

## 2019-09-19 ENCOUNTER — OFFICE VISIT (OUTPATIENT)
Dept: PHYSICAL THERAPY | Facility: CLINIC | Age: 69
End: 2019-09-19
Payer: COMMERCIAL

## 2019-09-19 DIAGNOSIS — G93.89 ENCEPHALOMALACIA WITH CEREBRAL INFARCTION (HCC): Primary | ICD-10-CM

## 2019-09-19 DIAGNOSIS — I63.9 ENCEPHALOMALACIA WITH CEREBRAL INFARCTION (HCC): Primary | ICD-10-CM

## 2019-09-19 DIAGNOSIS — G81.91 HEMIPARESIS OF RIGHT DOMINANT SIDE DUE TO CEREBROVASCULAR DISEASE (HCC): ICD-10-CM

## 2019-09-19 DIAGNOSIS — I67.9 HEMIPARESIS OF RIGHT DOMINANT SIDE DUE TO CEREBROVASCULAR DISEASE (HCC): ICD-10-CM

## 2019-09-19 PROCEDURE — 97110 THERAPEUTIC EXERCISES: CPT | Performed by: PHYSICAL THERAPIST

## 2019-09-19 PROCEDURE — 97112 NEUROMUSCULAR REEDUCATION: CPT | Performed by: PHYSICAL THERAPIST

## 2019-09-19 NOTE — PROGRESS NOTES
Daily Note     Today's date: 2019  Patient name: Reuben Howard  : 1950  MRN: [id number]  Referring provider: Mimi Boyd DO  Dx:   Encounter Diagnosis     ICD-10-CM    1  Encephalomalacia with cerebral infarction ([facility] 75 ) G93 89     I63 9    2  Hemiparesis of right dominant side due to cerebrovascular disease ([facility] ) I67 9     G81 91        Start Time: 1545  Stop Time: 1645  Total time in clinic (min): 60 minutes    Subjective: Pt with no new complaints at this time  Objective: See treatment diary below      Assessment: Tolerated treatment well  Patient would benefit from continued PT  Pt required frequent verbal and tactile cuing for proper exercise technique  Continue to progress as tolerated by the patient  Plan: Continue per plan of care  Progress treatment as tolerated         Manual      PROM right shoulder, wrist and hand    10   5   AAROM right shoulder, wrist and hand        5   Facilitory tech         5   STM wrist/hand    home                         Exercise Diary     MAT             Trunk Rotation 8# 220 8# 2x20 8# 20  10#20 6#  4# 2/20 4# 2x20  4#   5#   3#    T-band Abd L3 3/10 L3 3x10   L4 3/10  L5 3/10  L5 3/10   SAQ 4# 3/10 4# 3x10 #4 3/10  5# 3/10 4# 3/10   Cane press    2x20 /10 hh  /10hh 3/10    Nu-step  L4 10 m L4 10 min   L4  10m  L4 10m  L4 10m rev   UBE 10m L4    L4 10 min  8m L4 L4 8 m L4 8 min   Seated            LAQ            T-band DF            Pulleys 4 min  4 min  4 min ------> 4 min    Active wrist ROM            Finger "dance" 10x   home 10     Standing            Calf raise ------>    ----> --->   March ------>    ----> --->   Mini squat ------->    ---> --->                BALANCE             cone reach            Theraputty /key pinch    O  2/10  y 2/10  ----->     Tendon glides

## 2019-09-23 ENCOUNTER — OFFICE VISIT (OUTPATIENT)
Dept: PHYSICAL THERAPY | Facility: CLINIC | Age: 69
End: 2019-09-23
Payer: COMMERCIAL

## 2019-09-23 DIAGNOSIS — I63.9 ENCEPHALOMALACIA WITH CEREBRAL INFARCTION (HCC): Primary | ICD-10-CM

## 2019-09-23 DIAGNOSIS — G93.89 ENCEPHALOMALACIA WITH CEREBRAL INFARCTION (HCC): Primary | ICD-10-CM

## 2019-09-23 DIAGNOSIS — G81.91 HEMIPARESIS OF RIGHT DOMINANT SIDE DUE TO CEREBROVASCULAR DISEASE (HCC): ICD-10-CM

## 2019-09-23 DIAGNOSIS — I67.9 HEMIPARESIS OF RIGHT DOMINANT SIDE DUE TO CEREBROVASCULAR DISEASE (HCC): ICD-10-CM

## 2019-09-23 PROCEDURE — 97112 NEUROMUSCULAR REEDUCATION: CPT

## 2019-09-23 PROCEDURE — 97110 THERAPEUTIC EXERCISES: CPT

## 2019-09-23 PROCEDURE — 97140 MANUAL THERAPY 1/> REGIONS: CPT

## 2019-09-23 NOTE — PROGRESS NOTES
Daily Note     Today's date: 2019  Patient name: Piotr Dent  : 1950  MRN: [id number]  Referring provider: Jacinda Emery DO  Dx:   Encounter Diagnosis     ICD-10-CM    1  Encephalomalacia with cerebral infarction ([facility] 75 ) G93 89     I63 9    2  Hemiparesis of right dominant side due to cerebrovascular disease ([facility] ) I67 9     G81 91        Start Time: 1400  Stop Time: 1505  Total time in clinic (min): 65 minutes    Subjective: Pt indicates continued Hand swelling/ discomfort as well as R shoulder discomfort with overhead motions  Objective: See treatment diary below      Assessment: Tolerated treatment fair  Patient would benefit from continued PT      Plan: Continue per plan of care        Manual      PROM right shoulder, wrist and hand    10   5   AAROM right shoulder, wrist and hand        5   Facilitory tech         5   STM wrist/hand    home                         Exercise Diary     MAT             Trunk Rotation 8#  8# 2x20 10#   10#20 6#  4# 20 4# 2x20  5#   5#   3# 2   T-band Abd L3 3/10 L3 3x10   L5 3/10  L5 3/10  L5 3/10   SAQ 4# 3/10 4# 3x10 #5 3/10  5# 3/10 4# 3/10   Cane press    2x20 1 5# 2/10  2/10hh 3/10    Nu-step  L4 10 m L4 10 min   L4  10m  L4 10m  L4 10m rev   UBE 10m L4    L4 10 min  8m L4 L4 8 m L4 8 min   Seated            LAQ            T-band DF            Pulleys 4 min  4 min  4 m ------> 4 min    Active wrist ROM            Finger "dance" 10x   home /10     Standing            Calf raise ------>    ----> --->   March ------>    ----> --->   Mini squat ------->    ---> --->                BALANCE             cone reach            Theraputty /key pinch      ----->     Tendon glides